# Patient Record
Sex: MALE | Race: WHITE | URBAN - METROPOLITAN AREA
[De-identification: names, ages, dates, MRNs, and addresses within clinical notes are randomized per-mention and may not be internally consistent; named-entity substitution may affect disease eponyms.]

---

## 2020-03-13 ENCOUNTER — VIRTUAL VISIT (OUTPATIENT)
Dept: FAMILY MEDICINE | Facility: OTHER | Age: 35
End: 2020-03-13

## 2020-03-19 NOTE — PROGRESS NOTES
"Date: 2020 11:42:25  Clinician: Lisa Park  Clinician NPI: [id number]  Patient: John Ferreira  Patient : 1985  Patient Address: [address], [place] [address]  Patient Phone: [phone]  Visit Protocol: URI  Patient Summary:  John is a 34 year old ( : 1985 ) male who initiated a Visit for cold, sinus infection, or influenza. When asked the question \"Please sign me up to receive news, health information and promotions from [employer].\", John responded \"No\".    John states his symptoms started gradually 7-9 days ago.   His symptoms consist of a cough and rhinitis.   Symptom details     Nasal secretions: The color of his mucus is clear and yellow.    Cough: John coughs every 5-10 minutes and his cough is not more bothersome at night. Phlegm comes into his throat when he coughs. He believes his cough is caused by post-nasal drip. The color of the phlegm is yellow and clear.      John denies having chills, wheezing, sore throat, nasal congestion, fever, teeth pain, ear pain, malaise, headache, facial pain or pressure, and myalgias. He also denies taking antibiotic medication for the symptoms, having recent facial or sinus surgery in the past 60 days, and double sickening (worsening symptoms after initial improvement). He is not experiencing dyspnea.   Precipitating events  He has not recently been exposed to someone with influenza. John has been in close contact with the following high risk individuals: pregnant women and children under the age of 5.   Pertinent COVID-19 (Coronavirus) information  John has not traveled internationally or to the areas where COVID-19 (Coronavirus) is widespread in the last 14 days before the start of his symptoms.   John has not had close contact with a laboratory-confirmed positive COVID-19 patient or someone under quarantine for suspected COVID-19 within 14 days of symptom onset.   John is not a healthcare worker or does not work in a healthcare facility.   " Pertinent medical history  John needs a return to work/school note.   Weight: 155 lbs   John does not smoke or use smokeless tobacco.   Additional information as reported by the patient (free text): Type 1 Diabetic.   Weight: 155 lbs    MEDICATIONS: Fiasp U-100 Insulin subcutaneous, ALLERGIES: Penicillins  Clinician Response:  Dear John,    Based on the information you have provided, it does not appear you need Coronavirus (COVID-19) testing.   At this time, we recommend testing primarily for those people who have symptoms of cough and fever and have either traveled to a known area of infection or have been exposed to someone with laboratory confirmed Coronavirus by close contact.  At this time, conditions like diabetes do not have recommendations for testing different than the standard testing recommendation.    It does sound like you have a viral illness, but not likely that this is COVID. This could be a viral URI (common cold) or influenza. In either of these situations, the recommendation would be for you to stay hydrated, treat your symptoms with medications like tylenol and ibuprofen and avoid spreading illness by avoiding people until your symptoms are better, washing your hands and monitoring for any worsening of your symptoms.&nbsp;     Coronavirus - General Information:   The coronavirus infection starts within 14 days of an exposure.  Symptoms are those of a respiratory infection (such as fever, cough).  If you have not had symptoms by day 15, you should be considered uninfected by coronavirus.   Coronavirus - Symptoms:   The coronavirus can cause a respiratory illness, such as bronchitis or pneumonia.  The most common symptoms are: cough, fever, and shortness of breath.  Other symptoms are: body aches, chills, diarrhea, fatigue, headache, runny nose, and sore throat   Coronavirus - Exposure Risk Factors:   Exposure to a person who has been diagnosed with coronavirus.  Travel from an area with recent  local transmission of coronavirus.  The CDC (www.cdc.gov) has the most up-to-date list of where the coronavirus outbreak is occurring.   Coronavirus - Spreading:   The virus likely spreads through respiratory droplets produced when a person coughs or sneezes. These respiratory droplets can travel approximately 6 feet and can remain on surfaces. Common disinfectants will kill the virus.  The CDC currently does not recommend healthy people wear masks.   Coronavirus - Protect Yourself:   Avoid close contact with people known to have this new coronavirus infection.  Wash hands often with soap and water or alcohol-based hand .  Avoid touching the eyes, nose or mouth.   Thank you for limiting contact with others, wearing a simple mask to cover your cough, practice good hand hygiene habits and accessing our virtual services where possible to limit the spread of this virus.  For more information about COVID19 and options for caring for yourself at home, please visit the CDC website at&nbsp;https://www.cdc.gov/coronavirus/2019-ncov/about/steps-when-sick.html  For more options for care at [facility], please visit our website at&nbsp;[email or web address]              COVID-19 (Coronavirus) General Information  With the increase in the number of COVID-19 (Coronavirus) cases, we understand you may have some questions. Below is some helpful information on COVID-19 (Coronavirus).  How can I protect myself and others from the COVID-19 (Coronavirus)?  Because there is currently no vaccine to prevent infection, the best way to protect yourself is to avoid being exposed to this virus. Put distance between yourself and other people if COVID-19 (Coronavirus) is spreading in your community. The virus is thought to spread mainly from person-to-person.     Between people who are in close contact with one another (within about 6 about) for prolonged period (10 minutes or longer).    Through  respiratory droplets produced when an infected person coughs or sneezes.     The CDC recommends the following additional steps to protect yourself and others:     Wash your hands often with soap and water for at least 20 seconds, especially after blowing your nose, coughing, or sneezing; going to the bathroom; and before eating or preparing food.  Use an alcohol-based hand  that contains at least 60 percent alcohol if soap and water are not available.        Avoid touching your eyes, nose and mouth with unwashed hands.    Avoid close contact with people who are sick.    Stay home when you are sick.    Cover your cough or sneeze with a tissue, then throw the tissue in the trash.    Clean and disinfect frequently touched objects and surfaces.     You can help stop COVID-19 (Coronavirus) by knowing the signs and symptoms:     Fever    Cough    Shortness of breath     Contact your healthcare provider if   Develop symptoms   AND   Have been in close contact with a person known to have COVID-19 (Coronavirus) or live in or have recently traveled from an area with ongoing spread of COVID-19 (Coronavirus). Call ahead before you go to a doctor's office or emergency room. Tell them about your recent travel and your symptoms.   For the most up to date information, visit the CDC's website.  Steps to help prevent the spread of COVID-19 (Coronavirus) if you are sick  If you are sick with COVID-19 (Coronavirus) or suspect you are infected with the virus that causes COVID-19 (Coronavirus), follow the steps below to help prevent the disease from spreading&nbsp;to people in your home and community.     Stay home except to get medical care. Home isolation may be started in consultation with your healthcare clinician.    Separate yourself from other people and animals in your home.    Call ahead before visiting your doctor if you have a medical appointment.    Wear a facemask when you are around other people.    Cover your  "cough and sneezes.    Clean your hands often.    Avoid sharing personal household items.    Clean and disinfect frequently touched objects and surfaces everyday.    You will need to have someone drop off medications or household supplies (if needed) at your house without coming inside or in contact with you or others living in your house.    Monitor your symptoms and seek prompt medical care if your illness is worsening (e.g. Difficulty breathing).    Discontinue home isolation only in consultation with your healthcare provider.     For more detailed and up to date information on what to do if you are sick, visit this link: What to Do If You Are Sick With Coronavirus Disease 2019 (COVID-19).  Do I need to be tested for COVID-19 (Coronavirus)?     At this time, the limited number of tests available are controlled by the state and local health departments and are being reserved for more seriously ill patients, those with known exposure to confirmed patients, and those with recent travel (within 14 days) to countries with high rates of COVID-19 (Coronavirus).    Decisions on which patients receive testing will be based on the local spread of COVID-19 (Coronavirus) as well as the symptoms. Your healthcare provider will make the final decision on whether you should be tested.    In the meantime, if you have concerns that you may have been exposed, it is reasonable to practice \"social distancing.\"&nbsp; If you are ill with a cold or flu-like illness, please monitor your symptoms and reach out to your healthcare provider if your symptoms worsen.    For more up to date information, visit this link: COVID-19 (Coronavirus) Frequently Asked Questions and Answers.      Diagnosis: Cough  Diagnosis ICD: R05  "

## 2021-03-28 ENCOUNTER — HEALTH MAINTENANCE LETTER (OUTPATIENT)
Age: 36
End: 2021-03-28

## 2021-09-12 ENCOUNTER — HEALTH MAINTENANCE LETTER (OUTPATIENT)
Age: 36
End: 2021-09-12

## 2021-11-07 ENCOUNTER — HEALTH MAINTENANCE LETTER (OUTPATIENT)
Age: 36
End: 2021-11-07

## 2022-04-24 ENCOUNTER — HEALTH MAINTENANCE LETTER (OUTPATIENT)
Age: 37
End: 2022-04-24

## 2022-06-19 ENCOUNTER — HEALTH MAINTENANCE LETTER (OUTPATIENT)
Age: 37
End: 2022-06-19

## 2022-11-19 ENCOUNTER — HEALTH MAINTENANCE LETTER (OUTPATIENT)
Age: 37
End: 2022-11-19

## 2023-04-09 ENCOUNTER — HEALTH MAINTENANCE LETTER (OUTPATIENT)
Age: 38
End: 2023-04-09

## 2023-06-01 ENCOUNTER — HEALTH MAINTENANCE LETTER (OUTPATIENT)
Age: 38
End: 2023-06-01

## 2023-11-18 ENCOUNTER — HEALTH MAINTENANCE LETTER (OUTPATIENT)
Age: 38
End: 2023-11-18

## 2025-07-19 ENCOUNTER — HOSPITAL ENCOUNTER (EMERGENCY)
Facility: CLINIC | Age: 40
Discharge: HOME | End: 2025-07-19
Attending: STUDENT IN AN ORGANIZED HEALTH CARE EDUCATION/TRAINING PROGRAM
Payer: COMMERCIAL

## 2025-07-19 VITALS
TEMPERATURE: 97.6 F | OXYGEN SATURATION: 100 % | HEART RATE: 105 BPM | DIASTOLIC BLOOD PRESSURE: 84 MMHG | RESPIRATION RATE: 18 BRPM | SYSTOLIC BLOOD PRESSURE: 110 MMHG

## 2025-07-19 DIAGNOSIS — T18.128A ESOPHAGEAL OBSTRUCTION DUE TO FOOD IMPACTION: ICD-10-CM
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
## 2025-07-19 PROCEDURE — 99284 EMERGENCY DEPT VISIT MOD MDM: CPT | Mod: 25 | Performed by: STUDENT IN AN ORGANIZED HEALTH CARE EDUCATION/TRAINING PROGRAM

## 2025-07-19 PROCEDURE — 250N000011 HC RX IP 250 OP 636: Mod: JZ | Performed by: STUDENT IN AN ORGANIZED HEALTH CARE EDUCATION/TRAINING PROGRAM

## 2025-07-19 RX ORDER — NITROGLYCERIN 0.4 MG/1
0.4 TABLET SUBLINGUAL EVERY 5 MIN PRN
Status: DISCONTINUED | OUTPATIENT
Start: 2025-07-19 | End: 2025-07-19 | Stop reason: HOSPADM

## 2025-07-19 RX ADMIN — GLUCAGON 1 MG: 1 INJECTION, POWDER, LYOPHILIZED, FOR SOLUTION INTRAMUSCULAR; INTRAVENOUS at 10:37

## 2025-07-19 ASSESSMENT — COLUMBIA-SUICIDE SEVERITY RATING SCALE - C-SSRS
1. IN THE PAST MONTH, HAVE YOU WISHED YOU WERE DEAD OR WISHED YOU COULD GO TO SLEEP AND NOT WAKE UP?: NO
6. HAVE YOU EVER DONE ANYTHING, STARTED TO DO ANYTHING, OR PREPARED TO DO ANYTHING TO END YOUR LIFE?: NO
2. HAVE YOU ACTUALLY HAD ANY THOUGHTS OF KILLING YOURSELF IN THE PAST MONTH?: NO

## 2025-07-19 ASSESSMENT — ACTIVITIES OF DAILY LIVING (ADL): ADLS_ACUITY_SCORE: 41

## 2025-07-19 NOTE — ED NOTES
Expected Patient Referral to ED  9:42 AM    Referring Clinic/Provider:  [facility]    Reason for referral/Clinical facts:  Esophageal food impaction  Steak last night  DM-1, hasn't been able to swallow water  Tried EZ gas    Recommendations provided:  Send to ED for further evaluation    Caller was informed that this institution does possess the capabilities and/or resources to provide for patient and should be transferred to our facility.    Discussed that if direct admit is sought and any hurdles are encountered, this ED would be happy to see the patient and evaluate.    Informed caller that recommendations provided are recommendations based only on the facts provided and that they responsible to accept or reject the advice, or to seek a formal in person consultation as needed and that this ED will see/treat patient should they arrive.      Raúl Prather MD  [facility] EMERGENCY ROOM  [address] [place] [id number]  [phone]     Raúl Prather MD  07/20/25 0420

## 2025-07-19 NOTE — ED PROVIDER NOTES
EMERGENCY DEPARTMENT ENCOUNTER       ED Course & Medical Decision Making     10:12 AM I introduced myself to the patient, obtained patient history, performed a physical exam, and discussed plan for ED workup including potential diagnostic laboratory/imaging studies and interventions.   11:00 AM I spoke with Dr. Johns from [facility], suggested trialing sublingual nitro mixed in water  11:13 AM I rechecked patient.     Final Impression  39 year old male presents for evaluation of esophageal food bolus.  Patient states that he was eating some steak last night at about 6:15 PM when he felt to get stuck in his esophagus.  Patient denies ever having upper endoscopy for any reason or being seen by a GI doctor for esophageal food bolus, though he does describe food somewhat frequently getting stuck in his esophagus and having to drink carbonated beverages to help it pass, though behavior modification such as this as well as chewing things extra well, he has avoided any hospital visits for esophageal food bolus/obstruction.  States his brother also has a similar issue, though neither of ever been diagnosed with anything.  Patient was initially seen in the urgent care, they tried EZ gas which was unsuccessful.  On my initial evaluation the patient is holding a bag, though not actively spitting into it.  States he has not been able to keep down any water since yesterday evening, suspect he may be a little bit dehydrated given that his patient membranes are little dry and that may be the reason he is not spitting into a bag.  Tried 1 g IV glucagon, and after about 40 minutes patient did eventually have success, was able to tolerate a small can of soda, as well as a glass of water, also subsequently tolerated some crackers and liquids.  Did speak with [facility], they had suggested trying sublingual nitro dissolved in water, though shortly after our conversation patient had spontaneous improvement after just the IV glucagon and thus no  nitro was needed given.  Will discharge home though will strongly recommend he follow-up with [facility] to get an upper endoscopy for further evaluation on an outpatient basis.,     Prior to making a final disposition on this patient the results of patient's tests and other diagnostic studies were discussed with the patient. All questions were answered. Patient expressed understanding of the plan and was amenable to it.    Medical Decision Making  Supplemental history from: NA  Chart documentation includes differential considered  EKGs or imaging independently interpreted my me (see Labs & Imaging and EKG sections).  Considered admission / inpatient observation / escalation of cares for: Considered calling GI and for endoscopy,  though patient ultimately improved with more conservative management of IV glucagon  Discussion of management with another provider: Gastroenterology  Care impacted by chronic illness: Type 1 diabetes, hyperlipidemia  Disposition considerations: Discharge. No recommendations on prescription strength medication(s). See documentation for any additional details.    Not Applicable    None      Medications   nitroGLYcerin (NITROSTAT) sublingual tablet 0.4 mg (0.4 mg Sublingual Not Given 7/19/25 1133)   glucagon injection 1 mg (1 mg Intravenous $Given 7/19/25 1037)       Discharge Medication List as of 7/19/2025 11:27 AM        Discharge Medication List as of 7/19/2025 11:27 AM          Final Impression     1. Esophageal obstruction due to food impaction          Chief Complaint     Chief Complaint   Patient presents with    Dysphagia     HPI     John Ferreira is a 39 year old male who presents for evaluation of dysphagia.    Patient reports he was eating steak yesterday at 6:15 PM when it got stuck in his esophagus. Patient tried drinking still and sparkling water but is unable to swallow and spits water back up. Urgent care tried EZ gas with no results. Patient reports he regularly gets food stuck  in esophagus but swallowing water helps it pass to stomach or he spits it back up. Patient has DM type I and is worried about his blood glucose. Notes his brother has similar dysphagia issues but doctors have not found a cause.     I, Wendie Silvestre am serving as a scribe to document services personally performed by Dr. Raúl Prather MD, based on my observation and the provider's statements to me. I, Dr. Raúl Prather MD attest that Wendie Silvestre is acting in a scribe capacity, has observed my performance of the services and has documented them in accordance with my direction.    Physical Exam     /84   Pulse 105   Temp 97.6  F (36.4  C) (Temporal)   Resp 18   SpO2 100%   Constitutional: Awake, alert, in no acute distress.  Head: Normocephalic, atraumatic.  ENT: Mucous membranes slightly dry.  Holding a bag, though not actively spitting into it  Eyes: Conjunctiva normal.  Respiratory: Respirations even, unlabored, in no acute respiratory distress.  Cardiovascular: Regular rate and rhythm. Good peripheral perfusion.  GI: Abdomen soft, non-tender.  Musculoskeletal: Moves all 4 extremities equally.  Integument: Warm, dry.  Neurologic: Alert & oriented x 3. Normal speech. Grossly normal motor and sensory function. No focal deficits noted.  Psychiatric: Normal mood    Labs & Imaging        Raúl Prather MD  07/19/25 0345

## 2025-07-19 NOTE — ED TRIAGE NOTES
"Pt presents to the ED sent over by urgent care. Last night pt was eating steak and felt a piece get stuck in his throat. Pt \"tried for hours\" to use water to push the steak into his stomach. States will just vomit the water up. No SOB and tolerating his own secretions. Tried EZ Gas at urgent care. Hx Type 1 DM.      Triage Assessment (Adult)       Row Name 07/19/25 1006          Triage Assessment    Airway WDL WDL        Respiratory WDL    Respiratory WDL WDL        Skin Circulation/Temperature WDL    Skin Circulation/Temperature WDL WDL        Cardiac WDL    Cardiac WDL X;rhythm     Pulse Rate & Regularity tachycardic        Peripheral/Neurovascular WDL    Peripheral Neurovascular WDL WDL        Cognitive/Neuro/Behavioral WDL    Cognitive/Neuro/Behavioral WDL WDL                     "

## 2025-07-19 NOTE — DISCHARGE INSTRUCTIONS
Thankfully your esophageal food bolus responded to 1 mg of IV glucagon.  If this happens again, you may want to have them try this again in the future if these gas does not work.    Additionally, it may be worthwhile to follow-up with gastroenterology to have an upper endoscopy done to take a look at your esophagus and see if they can figure out why food keeps getting stuck.

## 2025-07-19 NOTE — ED NOTES
Pt reports he was able to keep both sprite and water down. Reports he was unable to keep liquids down prior to glucagon admin. MD notified.

## 2025-07-27 ENCOUNTER — HEALTH MAINTENANCE LETTER (OUTPATIENT)
Age: 40
End: 2025-07-27